# Patient Record
Sex: FEMALE | Race: WHITE | Employment: OTHER | URBAN - METROPOLITAN AREA
[De-identification: names, ages, dates, MRNs, and addresses within clinical notes are randomized per-mention and may not be internally consistent; named-entity substitution may affect disease eponyms.]

---

## 2018-10-04 NOTE — H&P
Elsa Saldivar 10/4/2018 1:58 PM 
Location: [address] Patient #: [id number] : 1960  / Language: [place] / Myrtle Lovings: Anara Patches Female History of Present Illness Ferdinand Kehr; 10/4/2018 2:08 PM) The patient is a 59year old female who presents for a recheck of Hand Problem. The problem is described as being located in the right hand (Patient presents for reevaluation for possible excision of right middle finger mass on 2018). The symptom has been occuring for 11 months. The patient describes having numbness. The course has been worsening. The hand problem is described as mild. The hand problem is aggravated by flexion of the fingers and extention of the fingers. The hand problem is relieved by nothing. The symptoms have been associated with mass,  while the symptoms have not been associated with painful ROM, decreased ROM, numbness or tingling. Previous evaluations done by none. Last office visit was Date: (2018). The treatment performed last visit was other (schedule surgery). The patient's dominant hand is their right. Problem List/Past Medical Ferdinand Kehr; 10/4/2018 2:08 PM) Arthritis (716.90  M19.90)  
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  
Mass of finger of right hand (782.2  R22.31)  Allergies Ferdinand Kehr; 10/4/2018 2:08 PM) Latex  
Statins Depletion *DIETARY PRODUCTS/DIETARY MANAGEMENT PRODUCTS*  
 
Family History Ferdinand Kehr; 10/4/2018 2:08 PM) Family history unknown  
 
Social History Ferdinand Kehr; 10/4/2018 2:08 PM) Tobacco Use  Quit smoking. Alcohol Use  Drinks wine. 2-4/week Medication History Ferdinand Kehr; 10/4/2018 2:08 PM) Otezla  (30MG Tablet, Oral two times daily) Active. CeleBREX  (200MG Capsule, Oral two times daily) Active. Ezetimibe  (10MG Tablet, Oral daily) Active. Vitamin C  (1000MG Tablet, Oral daily) Active. Turmeric  (500MG Tablet, 2 Oral daily) Active. Garlic  (8468EE Capsule, Oral) Active. Calcium  (Oral) Specific strength unknown - Active. Magnesium  (Oral) Specific strength unknown - Active. Mc Low Dose  (81MG Tablet Chewable, Oral) Active. Glucosamine Chondroitin Complx  (Oral) Active. Collagen  (Oral) Specific strength unknown - Active. Co Q-10  (300MG Capsule, Oral) Active. Biotin  (2500MCG Capsule, Oral) Active. Medications Reconciled  
 
Past Surgical History Don Sever; 10/4/2018 2:08 PM) No pertinent past surgical history  
 
 
 
 
Review of Systems (Candy Myrick MD; 10/4/2018 3:26 PM) General Not Present- Chills and Fever. Skin Not Present- Bruising, Pallor and Skin Color Changes. Respiratory Not Present- Cough and Difficulty Breathing. Cardiovascular Not Present- Chest Pain and Fainting / Blacking Out. Musculoskeletal Not Present- Decreased Range of Motion, Joint Pain and Joint Swelling. Neurological Not Present- Dysesthesia, Paresthesias and Weakness In Extremities. Hematology Not Present- Abnormal Bleeding and Petechiae. Note:  I have reviewed and confimed patient's history of present illness, past medical and surgical histories and review of systems as documented by my staff. I have discussed the pertinent items with the patient during this visit. Physical Exam (Candy Myrick MD; 10/4/2018 3:33 PM) General 
Mental Status - Alert. General Appearance - Cooperative and Well groomed, Not in acute distress, Not Sickly. Orientation - Oriented X4. Build & Nutrition - Well nourished and Well developed. Posture - Normal posture. Gait - Normal. 
Hydration - Well hydrated. Soso Kitchen General Characteristics Color - normal coloration of skin. Skin Moisture - normal skin moisture. Texture - normal skin texture. Problem #1 Description - Appearance - cyst. Coloration - dull red (small pump (approximately 3 mm diameter) at the skin overlying the cyst).  Size - Description of size - small (approximately 6 mm diameter). Description - Borders - The borders of the lesion are well-defined. Shape - irregular. Characteristics - fixed, mildly tender (\"numbing\" tenderness) and with semisolid material. Extent of Tissue Involvement - deep. Location - Right Upper Extremity - middle finger (radial proximal phalanx region just volar to the mid axial line, and just proximal to the PIP flexion crease). Chest and Lung Exam 
Inspection Chest Wall - Normal. Shape - Normal and Symmetric. Movements - Symmetrical. Accessory muscles - No use of accessory muscles in breathing. Auscultation Breath sounds - Normal. Adventitious sounds - No Adventitious sounds. Cardiovascular Auscultation Heart Sounds - S1 WNL and S2 WNL. Murmurs & Other Heart Sounds - Auscultation of the heart reveals - No Murmurs. Abdomen Inspection - Inspection Normal. 
 
Musculoskeletal 
Upper Extremity Hand/Wrist: 
Wrist: Inspection and Palpation - Mass - present (right long finger). Hand - Evaluation of related systems reveals - no digital clubbing or cyanosis and neurovascularly intact bilaterally. Inspection and Palpation - Crepitus - no crepitus bilateral. Sensation is - normal, (R). Instability - Right - no instability or laxity. Hand - Deformities/Malalignments/Discrepancies - no deformities, malalignments, or discrepancies. Phalanges: 
Right: Thumb - Functional Testing - Flexor Pollicis Longus is intact. Index Finger - Functional Testing - Flexor Digitorum Superficialis is intact and Flexor Digitorum Profundus is intact. Long Finger - Functional Testing - Flexor Digitorum Superficialis is intact and Flexor Digitorum Profundus is intact. Ring Finger - Functional Testing - Flexor Digitorum Superficialis is intact and Flexor Digitorum Profundus is intact. Small Finger - Functional Testing - Flexor Digitorum Superficialis is intact and Flexor Digitorum Profundus is intact. Assessment & Plan (Radha Payton. Rod Abreu MD; 10/4/2018 3:32 PM) Mass of finger of right hand (782.2  R22.31) Impression: soft tissue mass at the RIGHT middle finger is most probably a cyst, whether ganglion versus inclusion. Cyst appears to be about the same and is intimately involved with the dorsal aspect of the skin. Discussed surgical excision including anticipated location incisions, anticipated perioperative course and risks and possible complications. Patient understands the discussion and wishes to proceed with surgery. All questions answered. Current Plans The procedure was discussed with the patient and a written consent was obtained and questions were answered.  Risks of the procedure were discussed and include but are not limited to infection, bleeding, nerve, vascular injury as well as the need for future procedures.  It was also discussed the importance of compliance with the treatment directions and participation in the care of the treated extremity. List of current medications documented by the Provider () Pt Education - General Patient Education: discussed with patient and provided information. 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 Will obtain CBC with differential prior to surgery. Current Plans CBC, PLATELETS & AUT DIFF (46780) Note: _____________________________________ This note was created using voice recognition and transcription software.  Although proofread, it may have human proofreading, computer transcription and phonetic errors. Corrections may be performed at a later date. Please contact my office for any clarification needed. ADDENDUM 10/17/18 @ 0419: H&P reviewed. No changes. Discussed excision mass right middle finger. All questions answered. CBC reviewed.  
Virgil Angeles MD

## 2018-10-16 ENCOUNTER — ANESTHESIA EVENT (OUTPATIENT)
Dept: SURGERY | Age: 58
End: 2018-10-16
Payer: COMMERCIAL

## 2018-10-16 RX ORDER — EZETIMIBE 10 MG/1
10 TABLET ORAL DAILY
COMMUNITY
Start: 2018-07-30

## 2018-10-16 RX ORDER — CELECOXIB 200 MG/1
200 CAPSULE ORAL 2 TIMES DAILY
COMMUNITY
Start: 2018-08-29

## 2018-10-16 NOTE — PERIOP NOTES
PAT - SURGICAL PRE-ADMISSION INSTRUCTIONS 
 
NAME:  Ricky Cannon                                                          TODAY'S DATE:  10/16/2018 SURGERY DATE:  10/17/2018                                  SURGERY ARRIVAL TIME:   1200 1. Do NOT eat or drink anything, including candy or gum, after MIDNIGHT on 10/16/18 , unless you have specific instructions from your Surgeon or Anesthesia Provider to do so. 2. No smoking on the day of surgery. 3. No alcohol 24 hours prior to the day of surgery. 4. No recreational drugs for one week prior to the day of surgery. 5. Leave all valuables, including money/purse, at home. 6. Remove all jewelry, nail polish, makeup (including mascara); no lotions, powders, deodorant, or perfume/cologne/after shave. 7. Glasses/Contact lenses and Dentures may be worn to the hospital.  They will be removed prior to surgery. 8. Call your doctor if symptoms of a cold or illness develop within 24 ours prior to surgery. 9. AN ADULT MUST DRIVE YOU HOME AFTER OUTPATIENT SURGERY. 10. If you are having an OUTPATIENT procedure, please make arrangements for a responsible adult to be with you for 24 hours after your surgery. 11. If you are admitted to the hospital, you will be assigned to a bed after surgery is complete. Normally a family member will not be able to see you until you are in your assigned bed. 15. Family is encouraged to accompany you to the hospital.  We ask visitors in the treatment area to be limited to ONE person at a time to ensure patient privacy. EXCEPTIONS WILL BE MADE AS NEEDED. 15. Children under 12 are discouraged from entering the treatment area and need to be supervised by an adult when in the waiting room. Special Instructions: Take these medications the morning of surgery with a sip of water:  AM medications Patient Prep: 
 
shower with anti-bacterial soap These surgical instructions were reviewed with Leah Zhu during the PAT phone call. Directions: On the morning of surgery, please go to the [facility]. Enter the building from the [facility] entrance, 1st floor (next to the Emergency Room entrance). Take the elevator to the 2nd floor. Sign in at the Registration Desk. If you have any questions and/or concerns, please do not hesitate to call: 
(Prior to the day of surgery)  [facility] unit:  [phone] (Day of surgery)  [facility] unit:  [phone]

## 2018-10-17 ENCOUNTER — ANESTHESIA (OUTPATIENT)
Dept: SURGERY | Age: 58
End: 2018-10-17
Payer: COMMERCIAL

## 2018-10-17 ENCOUNTER — HOSPITAL ENCOUNTER (OUTPATIENT)
Age: 58
Setting detail: OUTPATIENT SURGERY
Discharge: HOME | End: 2018-10-17
Attending: PLASTIC SURGERY | Admitting: PLASTIC SURGERY
Payer: COMMERCIAL

## 2018-10-17 VITALS
WEIGHT: 131.25 LBS | HEART RATE: 75 BPM | TEMPERATURE: 97.6 F | OXYGEN SATURATION: 97 % | BODY MASS INDEX: 22.41 KG/M2 | RESPIRATION RATE: 16 BRPM | HEIGHT: 64 IN | SYSTOLIC BLOOD PRESSURE: 143 MMHG | DIASTOLIC BLOOD PRESSURE: 68 MMHG

## 2018-10-17 DIAGNOSIS — R22.31 FINGER MASS, RIGHT: Primary | ICD-10-CM

## 2018-10-17 LAB
BASOPHILS # BLD: 0 K/UL (ref 0–0.1)
BASOPHILS NFR BLD: 0 % (ref 0–2)
DIFFERENTIAL METHOD BLD: ABNORMAL
EOSINOPHIL # BLD: 0.3 K/UL (ref 0–0.4)
EOSINOPHIL NFR BLD: 4 % (ref 0–5)
ERYTHROCYTE [DISTWIDTH] IN BLOOD BY AUTOMATED COUNT: 15 % (ref 11.6–14.5)
HCT VFR BLD AUTO: 39.9 % (ref 35–45)
HGB BLD-MCNC: 12.6 G/DL (ref 12–16)
LYMPHOCYTES # BLD: 1.6 K/UL (ref 0.9–3.6)
LYMPHOCYTES NFR BLD: 18 % (ref 21–52)
MCH RBC QN AUTO: 26.5 PG (ref 24–34)
MCHC RBC AUTO-ENTMCNC: 31.6 G/DL (ref 31–37)
MCV RBC AUTO: 84 FL (ref 74–97)
MONOCYTES # BLD: 0.6 K/UL (ref 0.05–1.2)
MONOCYTES NFR BLD: 7 % (ref 3–10)
NEUTS SEG # BLD: 6.2 K/UL (ref 1.8–8)
NEUTS SEG NFR BLD: 71 % (ref 40–73)
PLATELET # BLD AUTO: 240 K/UL (ref 135–420)
PMV BLD AUTO: 9.2 FL (ref 9.2–11.8)
RBC # BLD AUTO: 4.75 M/UL (ref 4.2–5.3)
WBC # BLD AUTO: 8.7 K/UL (ref 4.6–13.2)

## 2018-10-17 PROCEDURE — 77030018836 HC SOL IRR NACL ICUM -A: Performed by: PLASTIC SURGERY

## 2018-10-17 PROCEDURE — 85025 COMPLETE CBC W/AUTO DIFF WBC: CPT | Performed by: PLASTIC SURGERY

## 2018-10-17 PROCEDURE — 74011000250 HC RX REV CODE- 250: Performed by: PLASTIC SURGERY

## 2018-10-17 PROCEDURE — 77030012422 HC DRN WND COVD -A: Performed by: PLASTIC SURGERY

## 2018-10-17 PROCEDURE — 88305 TISSUE EXAM BY PATHOLOGIST: CPT | Performed by: PLASTIC SURGERY

## 2018-10-17 PROCEDURE — 74011250636 HC RX REV CODE- 250/636

## 2018-10-17 PROCEDURE — 76010000138 HC OR TIME 0.5 TO 1 HR: Performed by: PLASTIC SURGERY

## 2018-10-17 PROCEDURE — 74011250637 HC RX REV CODE- 250/637: Performed by: NURSE ANESTHETIST, CERTIFIED REGISTERED

## 2018-10-17 PROCEDURE — 76210000021 HC REC RM PH II 0.5 TO 1 HR: Performed by: PLASTIC SURGERY

## 2018-10-17 PROCEDURE — 77030032490 HC SLV COMPR SCD KNE COVD -B: Performed by: PLASTIC SURGERY

## 2018-10-17 PROCEDURE — 77030020753 HC CUF TRNQT 1BLA STRY -B: Performed by: PLASTIC SURGERY

## 2018-10-17 PROCEDURE — 76060000032 HC ANESTHESIA 0.5 TO 1 HR: Performed by: PLASTIC SURGERY

## 2018-10-17 PROCEDURE — 88304 TISSUE EXAM BY PATHOLOGIST: CPT | Performed by: PLASTIC SURGERY

## 2018-10-17 PROCEDURE — 74011250636 HC RX REV CODE- 250/636: Performed by: NURSE ANESTHETIST, CERTIFIED REGISTERED

## 2018-10-17 PROCEDURE — 74011250636 HC RX REV CODE- 250/636: Performed by: PLASTIC SURGERY

## 2018-10-17 RX ORDER — PROPOFOL 10 MG/ML
INJECTION, EMULSION INTRAVENOUS AS NEEDED
Status: DISCONTINUED | OUTPATIENT
Start: 2018-10-17 | End: 2018-10-17 | Stop reason: HOSPADM

## 2018-10-17 RX ORDER — SODIUM CHLORIDE, SODIUM LACTATE, POTASSIUM CHLORIDE, CALCIUM CHLORIDE 600; 310; 30; 20 MG/100ML; MG/100ML; MG/100ML; MG/100ML
50 INJECTION, SOLUTION INTRAVENOUS CONTINUOUS
Status: CANCELLED | OUTPATIENT
Start: 2018-10-17

## 2018-10-17 RX ORDER — SODIUM CHLORIDE 0.9 % (FLUSH) 0.9 %
5-10 SYRINGE (ML) INJECTION AS NEEDED
Status: DISCONTINUED | OUTPATIENT
Start: 2018-10-17 | End: 2018-10-17 | Stop reason: HOSPADM

## 2018-10-17 RX ORDER — MIDAZOLAM HYDROCHLORIDE 1 MG/ML
INJECTION, SOLUTION INTRAMUSCULAR; INTRAVENOUS AS NEEDED
Status: DISCONTINUED | OUTPATIENT
Start: 2018-10-17 | End: 2018-10-17 | Stop reason: HOSPADM

## 2018-10-17 RX ORDER — LIDOCAINE HYDROCHLORIDE 20 MG/ML
INJECTION, SOLUTION EPIDURAL; INFILTRATION; INTRACAUDAL; PERINEURAL AS NEEDED
Status: DISCONTINUED | OUTPATIENT
Start: 2018-10-17 | End: 2018-10-17 | Stop reason: HOSPADM

## 2018-10-17 RX ORDER — SODIUM CHLORIDE 0.9 % (FLUSH) 0.9 %
5-10 SYRINGE (ML) INJECTION EVERY 8 HOURS
Status: DISCONTINUED | OUTPATIENT
Start: 2018-10-17 | End: 2018-10-17 | Stop reason: HOSPADM

## 2018-10-17 RX ORDER — ONDANSETRON 2 MG/ML
4 INJECTION INTRAMUSCULAR; INTRAVENOUS ONCE
Status: CANCELLED | OUTPATIENT
Start: 2018-10-17 | End: 2018-10-17

## 2018-10-17 RX ORDER — SODIUM CHLORIDE, SODIUM LACTATE, POTASSIUM CHLORIDE, CALCIUM CHLORIDE 600; 310; 30; 20 MG/100ML; MG/100ML; MG/100ML; MG/100ML
25 INJECTION, SOLUTION INTRAVENOUS CONTINUOUS
Status: DISCONTINUED | OUTPATIENT
Start: 2018-10-17 | End: 2018-10-17 | Stop reason: HOSPADM

## 2018-10-17 RX ORDER — CEFAZOLIN SODIUM 2 G/50ML
2 SOLUTION INTRAVENOUS ONCE
Status: COMPLETED | OUTPATIENT
Start: 2018-10-17 | End: 2018-10-17

## 2018-10-17 RX ORDER — LIDOCAINE HYDROCHLORIDE 10 MG/ML
0.1 INJECTION, SOLUTION EPIDURAL; INFILTRATION; INTRACAUDAL; PERINEURAL AS NEEDED
Status: DISCONTINUED | OUTPATIENT
Start: 2018-10-17 | End: 2018-10-17 | Stop reason: HOSPADM

## 2018-10-17 RX ORDER — HYDROCODONE BITARTRATE AND ACETAMINOPHEN 5; 325 MG/1; MG/1
1 TABLET ORAL
Qty: 5 TAB | Refills: 0 | Status: SHIPPED | OUTPATIENT
Start: 2018-10-17

## 2018-10-17 RX ORDER — ACETAMINOPHEN 325 MG/1
TABLET ORAL
Qty: 20 TAB | Refills: 0 | Status: SHIPPED | OUTPATIENT
Start: 2018-10-17

## 2018-10-17 RX ORDER — FENTANYL CITRATE 50 UG/ML
INJECTION, SOLUTION INTRAMUSCULAR; INTRAVENOUS AS NEEDED
Status: DISCONTINUED | OUTPATIENT
Start: 2018-10-17 | End: 2018-10-17 | Stop reason: HOSPADM

## 2018-10-17 RX ORDER — PROPOFOL 10 MG/ML
INJECTION, EMULSION INTRAVENOUS
Status: DISCONTINUED | OUTPATIENT
Start: 2018-10-17 | End: 2018-10-17 | Stop reason: HOSPADM

## 2018-10-17 RX ORDER — FENTANYL CITRATE 50 UG/ML
50 INJECTION, SOLUTION INTRAMUSCULAR; INTRAVENOUS AS NEEDED
Status: CANCELLED | OUTPATIENT
Start: 2018-10-17

## 2018-10-17 RX ORDER — FAMOTIDINE 20 MG/1
20 TABLET, FILM COATED ORAL ONCE
Status: COMPLETED | OUTPATIENT
Start: 2018-10-17 | End: 2018-10-17

## 2018-10-17 RX ADMIN — PROPOFOL 75 MCG/KG/MIN: 10 INJECTION, EMULSION INTRAVENOUS at 13:32

## 2018-10-17 RX ADMIN — FENTANYL CITRATE 25 MCG: 50 INJECTION, SOLUTION INTRAMUSCULAR; INTRAVENOUS at 13:35

## 2018-10-17 RX ADMIN — LIDOCAINE HYDROCHLORIDE 50 MG: 20 INJECTION, SOLUTION EPIDURAL; INFILTRATION; INTRACAUDAL; PERINEURAL at 13:32

## 2018-10-17 RX ADMIN — MIDAZOLAM HYDROCHLORIDE 2 MG: 1 INJECTION, SOLUTION INTRAMUSCULAR; INTRAVENOUS at 13:26

## 2018-10-17 RX ADMIN — CEFAZOLIN SODIUM 2 G: 2 SOLUTION INTRAVENOUS at 13:34

## 2018-10-17 RX ADMIN — FAMOTIDINE 20 MG: 20 TABLET ORAL at 12:57

## 2018-10-17 RX ADMIN — PROPOFOL 50 MG: 10 INJECTION, EMULSION INTRAVENOUS at 13:32

## 2018-10-17 RX ADMIN — SODIUM CHLORIDE, SODIUM LACTATE, POTASSIUM CHLORIDE, AND CALCIUM CHLORIDE 25 ML/HR: 600; 310; 30; 20 INJECTION, SOLUTION INTRAVENOUS at 12:55

## 2018-10-17 NOTE — DISCHARGE INSTRUCTIONS
DISCHARGE SUMMARY from Nurse    PATIENT INSTRUCTIONS:    After general anesthesia or intravenous sedation, for 24 hours or while taking prescription Narcotics:  · Limit your activities  · Do not drive and operate hazardous machinery  · Do not make important personal or business decisions  · Do  not drink alcoholic beverages  · If you have not urinated within 8 hours after discharge, please contact your surgeon on call. Report the following to your surgeon:  · Excessive pain, swelling, redness or odor of or around the surgical area  · Temperature over 100.5  · Nausea and vomiting lasting longer than 4 hours or if unable to take medications  · Any signs of decreased circulation or nerve impairment to extremity: change in color, persistent  numbness, tingling, coldness or increase pain  · Any questions    What to do at Home:    These are general instructions for a healthy lifestyle:    No smoking/ No tobacco products/ Avoid exposure to second hand smoke  Surgeon General's Warning:  Quitting smoking now greatly reduces serious risk to your health. Obesity, smoking, and sedentary lifestyle greatly increases your risk for illness    A healthy diet, regular physical exercise & weight monitoring are important for maintaining a healthy lifestyle    You may be retaining fluid if you have a history of heart failure or if you experience any of the following symptoms:  Weight gain of 3 pounds or more overnight or 5 pounds in a week, increased swelling in our hands or feet or shortness of breath while lying flat in bed. Please call your doctor as soon as you notice any of these symptoms; do not wait until your next office visit. Recognize signs and symptoms of STROKE:    F-face looks uneven    A-arms unable to move or move unevenly    S-speech slurred or non-existent    T-time-call 911 as soon as signs and symptoms begin-DO NOT go       Back to bed or wait to see if you get better-TIME IS BRAIN.     Warning Signs of HEART ATTACK     Call 911 if you have these symptoms:   Chest discomfort. Most heart attacks involve discomfort in the center of the chest that lasts more than a few minutes, or that goes away and comes back. It can feel like uncomfortable pressure, squeezing, fullness, or pain.  Discomfort in other areas of the upper body. Symptoms can include pain or discomfort in one or both arms, the back, neck, jaw, or stomach.  Shortness of breath with or without chest discomfort.  Other signs may include breaking out in a cold sweat, nausea, or lightheadedness. Don't wait more than five minutes to call 911 - MINUTES MATTER! Fast action can save your life. Calling 911 is almost always the fastest way to get lifesaving treatment. Emergency Medical Services staff can begin treatment when they arrive -- up to an hour sooner than if someone gets to the hospital by car. The discharge information has been reviewed with the patient. The patient verbalized understanding. Discharge medications reviewed with the patient and appropriate educational materials and side effects teaching were provided. ______  Patient armband removed and given to patient to take home.   Patient was informed of the privacy risks if armband lost or stolen_____________________________________________________________________________________________________________________________

## 2018-10-17 NOTE — PERIOP NOTES
Phase 2 Recovery Summary Patient arrived to Phase 2 at 1413 Report received from [facility], Cassie De La Torre CRNA Vitals:  
 10/16/18 1203 10/17/18 1238 10/17/18 1412 10/17/18 1413 BP:  150/72 123/68 143/68 Pulse:  81 75 75 Resp:   16 Temp:  97.6 °F (36.4 °C) SpO2:  98% 96% 97% Weight: 59 kg (130 lb) 59.5 kg (131 lb 4 oz) Height: 5' 4\" (1.626 m) 5' 4\" (1.626 m)    
 
 
oriented to time, place, person and situation Lines and Drains Peripheral Intravenous Line:   
 
Wound Wound Hand Right (Active) DRESSING STATUS Clean, dry, and intact 10/17/2018  2:17 PM  
DRESSING TYPE 4 x 4;Gauze wrap (nirmal) 10/17/2018  2:17 PM  
Number of days: 0 Patient discharged to home with Mary Villar, . Raghav Munson

## 2018-10-17 NOTE — BRIEF OP NOTE
BRIEF OPERATIVE NOTE 
 
BRIEF OPERATIVE NOTE Patient: Rey Nettles MRN: [id number]  CSN: [id number] YOB: 1960  Age: 59 y.o. Sex: female Date of Procedure: 10/17/2018 Preoperative Diagnosis: right middle finger mass  r22.31 Postoperative Diagnosis: right middle finger mass  r22.31 Procedure: Procedure(s): 
right middle finger mass excision Surgeon: Chandler Lion MD  
  
First Assistant:   BOOMC and BOOMS Anesthesia Staff: Anesthesiologist: Jesika Holbrook MD 
CRNA: Jesus Valdez CRNA Anesthesia: MAC Local Used:  5 cc 1 % lidocaine with 1:100,000 epinephrine and 0.5 % marcaine  50:50 mix Fluid:  450 cc crystalloid Tourniquet Time:  0 Estimated Blood Loss: < 5 cc Specimens:  
ID Type Source Tests Collected by Time Destination 1 : RIGHT MIDDLE FINGER MASS Preservative   Joseph Morales MD 10/17/2018 1358 Pathology Implants: * No implants in log * Findings: Mass consistent with probable ganglion cyst adherent to overlying skin. Complications: None; patient tolerated the procedure well. Chandler Lion MD 
10/17/2018 
2:13 PM 
 
Dictation Number: #[id number]

## 2018-10-17 NOTE — ANESTHESIA POSTPROCEDURE EVALUATION
Procedure(s): 
right middle finger mass excision. Anesthesia Post Evaluation Multimodal analgesia: multimodal analgesia used between 6 hours prior to anesthesia start to PACU discharge Patient location during evaluation: bedside Patient participation: complete - patient participated Level of consciousness: awake Pain management: adequate Airway patency: patent Anesthetic complications: no 
Cardiovascular status: acceptable Respiratory status: acceptable Hydration status: acceptable Visit Vitals /68 (BP 1 Location: Left arm, BP Patient Position: At rest) Pulse 75 Temp 36.4 °C (97.6 °F) Resp 16 Ht 5' 4\" (1.626 m) Wt 59.5 kg (131 lb 4 oz) SpO2 97% BMI 22.53 kg/m²

## 2018-10-17 NOTE — ANESTHESIA PREPROCEDURE EVALUATION
Anesthetic History No history of anesthetic complications Review of Systems / Medical History Patient summary reviewed and nursing notes reviewed Pulmonary Within defined limits Neuro/Psych Within defined limits Cardiovascular Exercise tolerance: >4 METS 
  
GI/Hepatic/Renal 
Within defined limits Endo/Other Arthritis Other Findings Comments: Hx of psoriatic spondylitis Physical Exam 
 
Airway Mallampati: II 
TM Distance: 4 - 6 cm Neck ROM: normal range of motion Cardiovascular Regular rate and rhythm,  S1 and S2 normal,  no murmur, click, rub, or gallop Rhythm: regular Rate: normal 
 
 
 
 Dental 
No notable dental hx Pulmonary Breath sounds clear to auscultation Abdominal 
GI exam deferred Other Findings Anesthetic Plan ASA: 2 Anesthesia type: MAC Anesthetic plan and risks discussed with: Patient

## 2018-10-18 NOTE — OP NOTES
[facility]  OPERATIVE REPORT    Gabriel Carrillo  MR#: [id number]  : 1960  ACCOUNT #: [de-identified]   DATE OF SERVICE: 10/17/2018    SURGEON:  Sindhu Abreu MD    ASSISTANT:  JEANNE     PREOPERATIVE DIAGNOSIS:  Right middle finger mass. POSTOPERATIVE DIAGNOSIS:  Right middle finger mass. PROCEDURE PERFORMED:  Excision of 5 mm x 3 mm mass, right middle finger. ANESTHESIA:  Local (1% lidocaine with 1:200,000 epinephrine: 0.5% Marcaine plain) and MAC.    TOURNIQUET TIME:  Zero. FLUIDS ADMINISTERED:  450 mL of crystalloid    SPECIMENS REMOVED:  Mass from right middle finger. ESTIMATED BLOOD LOSS:  Less than 5 mL    COMPLICATIONS:  None. IMPLANTS:  None. FINDINGS:  Mass, which is most consistent with ganglion cyst excised from the right middle finger, appears to be emanating from the flexor tendon sheath. Mass is tightly adherent to the dermis of the overlying skin. INDICATIONS:  Patient is a 25-year-old female with a history of an enlarging mass at the right middle finger. Examination revealed mass was involving the overlying skin. Mass has been increasing in size. Therefore, she is undergoing surgical excision. DESCRIPTION OF PROCEDURE:  After proper informed consent was obtained, she was brought to the operating room and left on the surgical gurney with the right upper extremity extended on the arm board. A well-padded tourniquet was placed on the arm, and once general sedation was achieved, surgical site at the middle finger, as well as at the base of the middle finger were cleansed with isopropyl alcohol and infiltrated with local anesthetic. The right upper extremity was then prepped and draped for a sterile field using ChloraPrep solution. Once local and epinephrine effects were assured, oblique incision was made to create a limb of a Ernesto incision along the proximal phalanx at the skin overlying the mass.   Skin changes overlying the mass were ellipsed and included with the specimen. Skin flaps were elevated using sharp and blunt dissection. Dissection was carried into the deep tissues. Mass was found to emanate from the flexor tendon region. Mass and overlying skin was removed in its entirety and  from the flexor sheath using a 15 blade scalpel. Examination of the deep structures of the finger at this location revealed no evidence of injury. Wounds were copiously irrigated using normal saline. Further exploration of the wound revealed no further masses present. Skin incision was closed using 6-0 PDS in a horizontal mattress fashion. Sterile dressings were applied consisting of Xeroform, 4 x 4's, and Lashay wrap. She was awakened from sedation and taken to phase 2 recovery in stable condition. There were no complications. The patient tolerated the procedure well. Counts were correct at the conclusion of procedure. Dr. Sridhar Fleming performed the entire procedure.       MD VIPUL Marcelino / Addie Opitz  D: 10/17/2018 14:36     T: 10/18/2018 09:34  JOB #: [id number]

## (undated) DEVICE — INTENDED FOR TISSUE SEPARATION, AND OTHER PROCEDURES THAT REQUIRE A SHARP SURGICAL BLADE TO PUNCTURE OR CUT.: Brand: BARD-PARKER ® CARBON RIB-BACK BLADES

## (undated) DEVICE — DRESSING,GAUZE,XEROFORM,CURAD,1"X8",ST: Brand: CURAD

## (undated) DEVICE — PENROSE TUBING RADIOPAQUE: Brand: ARGYLE

## (undated) DEVICE — CURITY STRETCH BANDAGE: Brand: CURITY

## (undated) DEVICE — PREP SKN CHLRAPRP 26ML TNT -- CONVERT TO ITEM 373320

## (undated) DEVICE — Z CONVERTED USE 2276353 RETAINER DSG SZ 4 10YD HND ARM LEG FT TBLR NET E SPANDAGE [S04] [MEDI TECH INTERNATIONAL]

## (undated) DEVICE — NEEDLE HYPO 25GA L1.5IN BVL ORIENTED ECLIPSE

## (undated) DEVICE — SYR 10ML CTRL LR LCK NSAF LF --

## (undated) DEVICE — BANDAGE COMPR EXSANGUATION SGL LAYERED NO CLSR 9FT LEN 4IN W

## (undated) DEVICE — KIT PROC EXTRM HND FT CUST LF --

## (undated) DEVICE — DISPOSABLE TOURNIQUET CUFF SINGLE BLADDER, SINGLE PORT AND QUICK CONNECT CONNECTOR: Brand: COLOR CUFF

## (undated) DEVICE — KENDALL SCD EXPRESS SLEEVES, KNEE LENGTH, MEDIUM: Brand: KENDALL SCD

## (undated) DEVICE — NDL PRT INJ NSAF BLNT 18GX1.5 --

## (undated) DEVICE — DRAPE,HAND,STERILE: Brand: MEDLINE

## (undated) DEVICE — STERILE POLYISOPRENE POWDER-FREE SURGICAL GLOVES: Brand: PROTEXIS

## (undated) DEVICE — SOLUTION IV 1000ML 0.9% SOD CHL